# Patient Record
Sex: MALE | Race: WHITE | NOT HISPANIC OR LATINO | Employment: FULL TIME | URBAN - METROPOLITAN AREA
[De-identification: names, ages, dates, MRNs, and addresses within clinical notes are randomized per-mention and may not be internally consistent; named-entity substitution may affect disease eponyms.]

---

## 2021-02-26 ENCOUNTER — OFFICE VISIT (OUTPATIENT)
Dept: URGENT CARE | Facility: PHYSICIAN GROUP | Age: 26
End: 2021-02-26
Payer: COMMERCIAL

## 2021-02-26 VITALS
HEART RATE: 69 BPM | BODY MASS INDEX: 24.48 KG/M2 | TEMPERATURE: 97.9 F | WEIGHT: 171 LBS | DIASTOLIC BLOOD PRESSURE: 78 MMHG | RESPIRATION RATE: 20 BRPM | SYSTOLIC BLOOD PRESSURE: 110 MMHG | HEIGHT: 70 IN | OXYGEN SATURATION: 97 %

## 2021-02-26 DIAGNOSIS — R22.0 SWELLING OF GUMS: ICD-10-CM

## 2021-02-26 DIAGNOSIS — K01.1 IMPACTED THIRD MOLAR TOOTH: ICD-10-CM

## 2021-02-26 PROCEDURE — 99203 OFFICE O/P NEW LOW 30 MIN: CPT | Performed by: EMERGENCY MEDICINE

## 2021-02-26 RX ORDER — PENICILLIN V POTASSIUM 500 MG/1
500 TABLET ORAL 4 TIMES DAILY
Qty: 28 TABLET | Refills: 0 | Status: SHIPPED | OUTPATIENT
Start: 2021-02-26 | End: 2021-03-05

## 2021-02-26 ASSESSMENT — ENCOUNTER SYMPTOMS
SORE THROAT: 0
SWOLLEN GLANDS: 0
FEVER: 0

## 2021-02-26 NOTE — PROGRESS NOTES
"Subjective:      Micah Anaya is a 25 y.o. male who presents with Oral Swelling (Swollen wisdom teeth right side; 4x days)            Oral Pain  This is a new problem. Episode onset: 4 days. The problem occurs daily. The problem has been gradually worsening. Pertinent negatives include no fever, sore throat or swollen glands. The symptoms are aggravated by eating. He has tried NSAIDs for the symptoms. The treatment provided mild relief.   Patient notes known impacted lower wisdom teeth.  Has had some similar exacerbations in the past.  Scheduled for dental follow-up in 4 days.    Review of Systems   Constitutional: Negative for fever.   HENT: Negative for sore throat.           Objective:     /78 (BP Location: Left arm, Patient Position: Sitting, BP Cuff Size: Adult)   Pulse 69   Temp 36.6 °C (97.9 °F) (Temporal)   Resp 20   Ht 1.778 m (5' 10\")   Wt 77.6 kg (171 lb)   SpO2 97%   BMI 24.54 kg/m²      Physical Exam  Constitutional:       General: He is not in acute distress.     Appearance: Normal appearance. He is well-developed. He is not ill-appearing.   HENT:      Head: Normocephalic and atraumatic.      Jaw: There is normal jaw occlusion. No trismus or malocclusion.      Salivary Glands: Right salivary gland is not diffusely enlarged or tender.      Right Ear: Tympanic membrane, ear canal and external ear normal.      Mouth/Throat:      Dentition: Gingival swelling present. No dental caries, dental abscesses or gum lesions.      Pharynx: Oropharynx is clear.     Lymphadenopathy:      Head:      Right side of head: No submandibular or tonsillar adenopathy.   Skin:     General: Skin is warm and dry.   Neurological:      Mental Status: He is alert.   Psychiatric:         Behavior: Behavior is cooperative.                 Assessment/Plan:        1. Impacted third molar tooth  Advised need for dental follow-up as soon as available.  Avoid excess chewing, may use Peridex swish and spit.  OTC " NSAIDs/Tylenol as needed pain  - penicillin v potassium (VEETID) 500 MG Tab; Take 1 tablet by mouth 4 times a day for 7 days.  Dispense: 28 tablet; Refill: 0    2. Swelling of gums  - penicillin v potassium (VEETID) 500 MG Tab; Take 1 tablet by mouth 4 times a day for 7 days.  Dispense: 28 tablet; Refill: 0

## 2022-01-05 ENCOUNTER — HOSPITAL ENCOUNTER (OUTPATIENT)
Dept: RADIOLOGY | Facility: MEDICAL CENTER | Age: 27
End: 2022-01-05
Attending: PHYSICIAN ASSISTANT
Payer: COMMERCIAL

## 2022-01-05 DIAGNOSIS — M54.16 LUMBAR RADICULOPATHY: ICD-10-CM

## 2022-01-05 DIAGNOSIS — M54.50 LOW BACK PAIN, UNSPECIFIED BACK PAIN LATERALITY, UNSPECIFIED CHRONICITY, UNSPECIFIED WHETHER SCIATICA PRESENT: ICD-10-CM

## 2022-01-05 PROCEDURE — 72100 X-RAY EXAM L-S SPINE 2/3 VWS: CPT

## 2022-01-06 ENCOUNTER — HOSPITAL ENCOUNTER (OUTPATIENT)
Dept: LAB | Facility: MEDICAL CENTER | Age: 27
End: 2022-01-06
Attending: PHYSICIAN ASSISTANT
Payer: COMMERCIAL

## 2022-01-06 LAB
ALBUMIN SERPL BCP-MCNC: 5 G/DL (ref 3.2–4.9)
ALBUMIN/GLOB SERPL: 2.3 G/DL
ALP SERPL-CCNC: 85 U/L (ref 30–99)
ALT SERPL-CCNC: 14 U/L (ref 2–50)
ANION GAP SERPL CALC-SCNC: 12 MMOL/L (ref 7–16)
AST SERPL-CCNC: 16 U/L (ref 12–45)
BASOPHILS # BLD AUTO: 1 % (ref 0–1.8)
BASOPHILS # BLD: 0.05 K/UL (ref 0–0.12)
BILIRUB SERPL-MCNC: 0.7 MG/DL (ref 0.1–1.5)
BUN SERPL-MCNC: 14 MG/DL (ref 8–22)
CALCIUM SERPL-MCNC: 9.5 MG/DL (ref 8.5–10.5)
CHLORIDE SERPL-SCNC: 103 MMOL/L (ref 96–112)
CHOLEST SERPL-MCNC: 183 MG/DL (ref 100–199)
CO2 SERPL-SCNC: 25 MMOL/L (ref 20–33)
CREAT SERPL-MCNC: 0.8 MG/DL (ref 0.5–1.4)
EOSINOPHIL # BLD AUTO: 0.09 K/UL (ref 0–0.51)
EOSINOPHIL NFR BLD: 1.7 % (ref 0–6.9)
ERYTHROCYTE [DISTWIDTH] IN BLOOD BY AUTOMATED COUNT: 39 FL (ref 35.9–50)
FASTING STATUS PATIENT QL REPORTED: NORMAL
GLOBULIN SER CALC-MCNC: 2.2 G/DL (ref 1.9–3.5)
GLUCOSE SERPL-MCNC: 96 MG/DL (ref 65–99)
HCT VFR BLD AUTO: 48.7 % (ref 42–52)
HDLC SERPL-MCNC: 49 MG/DL
HGB BLD-MCNC: 16.8 G/DL (ref 14–18)
IMM GRANULOCYTES # BLD AUTO: 0.02 K/UL (ref 0–0.11)
IMM GRANULOCYTES NFR BLD AUTO: 0.4 % (ref 0–0.9)
LDLC SERPL CALC-MCNC: 122 MG/DL
LYMPHOCYTES # BLD AUTO: 1.52 K/UL (ref 1–4.8)
LYMPHOCYTES NFR BLD: 29.5 % (ref 22–41)
MCH RBC QN AUTO: 30.4 PG (ref 27–33)
MCHC RBC AUTO-ENTMCNC: 34.5 G/DL (ref 33.7–35.3)
MCV RBC AUTO: 88.2 FL (ref 81.4–97.8)
MONOCYTES # BLD AUTO: 0.38 K/UL (ref 0–0.85)
MONOCYTES NFR BLD AUTO: 7.4 % (ref 0–13.4)
NEUTROPHILS # BLD AUTO: 3.1 K/UL (ref 1.82–7.42)
NEUTROPHILS NFR BLD: 60 % (ref 44–72)
NRBC # BLD AUTO: 0 K/UL
NRBC BLD-RTO: 0 /100 WBC
PLATELET # BLD AUTO: 227 K/UL (ref 164–446)
PMV BLD AUTO: 10.6 FL (ref 9–12.9)
POTASSIUM SERPL-SCNC: 4.1 MMOL/L (ref 3.6–5.5)
PROT SERPL-MCNC: 7.2 G/DL (ref 6–8.2)
RBC # BLD AUTO: 5.52 M/UL (ref 4.7–6.1)
SODIUM SERPL-SCNC: 140 MMOL/L (ref 135–145)
T4 FREE SERPL-MCNC: 1.38 NG/DL (ref 0.93–1.7)
TRIGL SERPL-MCNC: 59 MG/DL (ref 0–149)
TSH SERPL DL<=0.005 MIU/L-ACNC: 1.94 UIU/ML (ref 0.38–5.33)
WBC # BLD AUTO: 5.2 K/UL (ref 4.8–10.8)

## 2022-01-06 PROCEDURE — 84439 ASSAY OF FREE THYROXINE: CPT

## 2022-01-06 PROCEDURE — 84443 ASSAY THYROID STIM HORMONE: CPT

## 2022-01-06 PROCEDURE — 80053 COMPREHEN METABOLIC PANEL: CPT

## 2022-01-06 PROCEDURE — 36415 COLL VENOUS BLD VENIPUNCTURE: CPT

## 2022-01-06 PROCEDURE — 85025 COMPLETE CBC W/AUTO DIFF WBC: CPT

## 2022-01-06 PROCEDURE — 80061 LIPID PANEL: CPT

## 2022-09-14 ENCOUNTER — APPOINTMENT (OUTPATIENT)
Dept: RADIOLOGY | Facility: MEDICAL CENTER | Age: 27
End: 2022-09-14
Attending: PHYSICIAN ASSISTANT
Payer: COMMERCIAL

## 2022-10-29 ENCOUNTER — HOSPITAL ENCOUNTER (OUTPATIENT)
Dept: RADIOLOGY | Facility: MEDICAL CENTER | Age: 27
End: 2022-10-29
Attending: PHYSICIAN ASSISTANT
Payer: COMMERCIAL

## 2022-10-29 DIAGNOSIS — M54.16 LUMBAR RADICULOPATHY: ICD-10-CM

## 2022-10-29 DIAGNOSIS — M54.50 DORSALGIA OF LUMBAR REGION: ICD-10-CM

## 2022-10-29 PROCEDURE — 72148 MRI LUMBAR SPINE W/O DYE: CPT

## 2023-11-22 ENCOUNTER — OFFICE VISIT (OUTPATIENT)
Dept: URGENT CARE | Facility: PHYSICIAN GROUP | Age: 28
End: 2023-11-22
Payer: COMMERCIAL

## 2023-11-22 VITALS
OXYGEN SATURATION: 99 % | HEART RATE: 57 BPM | SYSTOLIC BLOOD PRESSURE: 120 MMHG | BODY MASS INDEX: 23.34 KG/M2 | DIASTOLIC BLOOD PRESSURE: 80 MMHG | HEIGHT: 70 IN | WEIGHT: 163 LBS | RESPIRATION RATE: 14 BRPM | TEMPERATURE: 97.6 F

## 2023-11-22 DIAGNOSIS — K04.7 DENTAL INFECTION: ICD-10-CM

## 2023-11-22 PROCEDURE — 3079F DIAST BP 80-89 MM HG: CPT | Performed by: NURSE PRACTITIONER

## 2023-11-22 PROCEDURE — 3074F SYST BP LT 130 MM HG: CPT | Performed by: NURSE PRACTITIONER

## 2023-11-22 PROCEDURE — 99213 OFFICE O/P EST LOW 20 MIN: CPT | Performed by: NURSE PRACTITIONER

## 2023-11-22 RX ORDER — AMOXICILLIN AND CLAVULANATE POTASSIUM 875; 125 MG/1; MG/1
1 TABLET, FILM COATED ORAL 2 TIMES DAILY
Qty: 20 TABLET | Refills: 0 | Status: SHIPPED | OUTPATIENT
Start: 2023-11-22 | End: 2023-12-02

## 2023-11-22 RX ORDER — IBUPROFEN 800 MG/1
800 TABLET ORAL EVERY 8 HOURS PRN
Qty: 30 TABLET | Refills: 0 | Status: SHIPPED | OUTPATIENT
Start: 2023-11-22

## 2023-11-22 ASSESSMENT — FIBROSIS 4 INDEX: FIB4 SCORE: 0.53

## 2023-11-23 NOTE — PROGRESS NOTES
"Subjective:   Micah Anaya is a 28 y.o. male who presents for Dental Pain (X4 days sever Dental pain, L side molars, having jaw pain and throat pain, has not contacted dentist yet, )    Patient is a 28-year-old male presented clinic today with 4-day history of worsening left lower dental pain, jaw pain, and mild gingival swelling.  Patient states he does have a long history of.  Tonsil disease and has had multiple extractions in the past.  He denies any tobacco use.  Denies any fevers, sore throat, or swollen glands.  Has been taking over-the-counter Aleve and Tylenol with some symptom relief.  Also been using clove.    Medications, Allergies, and current problem list reviewed today in Epic.     Objective:     /80   Pulse (!) 57   Temp 36.4 °C (97.6 °F) (Temporal)   Resp 14   Ht 1.778 m (5' 10\")   Wt 73.9 kg (163 lb)   SpO2 99%     Physical Exam  Vitals reviewed.   Constitutional:       General: He is not in acute distress.     Appearance: Normal appearance. He is not ill-appearing or toxic-appearing.   HENT:      Head: Normocephalic.      Nose: Nose normal.      Mouth/Throat:      Lips: Pink. No lesions.      Mouth: Mucous membranes are moist. No oral lesions.        Comments: Left lower molar: Tooth decay at base, negative for discharge or drainage.  Mild gingival swelling and dental tenderness.  Multiple dental caries noted.  Eyes:      Extraocular Movements: Extraocular movements intact.      Conjunctiva/sclera: Conjunctivae normal.      Pupils: Pupils are equal, round, and reactive to light.   Cardiovascular:      Rate and Rhythm: Normal rate.   Pulmonary:      Effort: Pulmonary effort is normal.   Musculoskeletal:         General: Normal range of motion.      Cervical back: Normal range of motion and neck supple.   Skin:     General: Skin is warm and dry.   Neurological:      General: No focal deficit present.      Mental Status: He is alert and oriented to person, place, and time. "   Psychiatric:         Mood and Affect: Mood normal.         Behavior: Behavior normal.         Assessment/Plan:     Diagnosis and associated orders:     1. Dental infection  amoxicillin-clavulanate (AUGMENTIN) 875-125 MG Tab    ibuprofen (MOTRIN) 800 MG Tab         Comments/MDM:     OTC Tylenol or Motrin for fever/discomfort.  Ice  Drink plenty of fluids  Follow-up with dentist as soon as possible  Return to clinic or go to the ED if symptoms worsen or fail to improve, or if the patient should develop worsening/increasing dental pain, facial swelling, redness or warmth to the affected area, difficulty swallowing, shortness of breath, fever/chills, and/or any concerning symptoms.           Differential diagnosis, natural history, supportive care, and indications for immediate follow-up discussed.    Advised the patient to follow-up with the primary care physician for recheck, reevaluation, and consideration of further management.    I personally reviewed prior external notes and test results pertinent to today's visit as well as additional imaging and testing completed in clinic today.     Please note that this dictation was created using voice recognition software. I have made a reasonable attempt to correct obvious errors, but I expect that there are errors of grammar and possibly content that I did not discover before finalizing the note.